# Patient Record
Sex: FEMALE | Race: BLACK OR AFRICAN AMERICAN | Employment: OTHER | URBAN - METROPOLITAN AREA
[De-identification: names, ages, dates, MRNs, and addresses within clinical notes are randomized per-mention and may not be internally consistent; named-entity substitution may affect disease eponyms.]

---

## 2018-10-08 ENCOUNTER — TELEPHONE (OUTPATIENT)
Dept: SURGERY | Age: 63
End: 2018-10-08

## 2018-10-08 NOTE — TELEPHONE ENCOUNTER
Care everywhere was ran. Patient from [place] and has no established [facility].

## 2018-10-10 ENCOUNTER — OFFICE VISIT (OUTPATIENT)
Dept: SURGERY | Age: 63
End: 2018-10-10

## 2018-10-10 VITALS
HEIGHT: 60 IN | BODY MASS INDEX: 32.2 KG/M2 | SYSTOLIC BLOOD PRESSURE: 129 MMHG | DIASTOLIC BLOOD PRESSURE: 82 MMHG | TEMPERATURE: 97.6 F | RESPIRATION RATE: 18 BRPM | HEART RATE: 85 BPM | OXYGEN SATURATION: 98 % | WEIGHT: 164 LBS

## 2018-10-10 DIAGNOSIS — K40.91 RECURRENT LEFT INGUINAL HERNIA: ICD-10-CM

## 2018-10-10 DIAGNOSIS — K40.90 RIGHT INGUINAL HERNIA: ICD-10-CM

## 2018-10-10 DIAGNOSIS — K43.2 RECURRENT VENTRAL HERNIA: Primary | ICD-10-CM

## 2018-10-10 RX ORDER — AMLODIPINE BESYLATE 10 MG/1
TABLET ORAL DAILY
COMMUNITY

## 2018-10-10 RX ORDER — ASPIRIN 325 MG
TABLET, DELAYED RELEASE (ENTERIC COATED) ORAL
COMMUNITY

## 2018-10-10 RX ORDER — HYDROCHLOROTHIAZIDE 25 MG/1
25 TABLET ORAL DAILY
COMMUNITY

## 2018-10-10 NOTE — PROGRESS NOTES
[facility]  General Surgery    Subjective:      HPI: Patient is a very pleasant 52-year-old female with a past medical history that is remarkable for according to the medical records colon resection with colostomy and colostomy reversal.  Last year she underwent incisional hernia repair with mesh and left inguinal hernia repair with mesh in [place]. In December 2018 she noticed that the bulge in the in the left groin had recurred. She states that she did not say anything because it was not bothering her at the time. She was seen over the spring by a physician in [place] who is concerned about the recurrence of the left inguinal hernia and possibly the incisional hernia as well. Her son suggested that she have a opinion here in the [place]. I was asked to see her regarding these hernias. There are no active problems to display for this patient. History reviewed. No pertinent past medical history. Past Surgical History:   Procedure Laterality Date    HX GYN      HX HERNIA REPAIR        Family History   Problem Relation Age of Onset    Hypertension Mother     Hypertension Father       Social History   Substance Use Topics    Smoking status: Never Smoker    Smokeless tobacco: Never Used    Alcohol use Not on file      Not on File    Prior to Admission medications    Medication Sig Start Date End Date Taking? Authorizing Provider   aspirin delayed-release 325 mg tablet Take  by mouth every six (6) hours as needed for Pain. Yes Historical Provider   hydroCHLOROthiazide (HYDRODIURIL) 25 mg tablet Take 25 mg by mouth daily. Yes Historical Provider   amLODIPine (NORVASC) 10 mg tablet Take  by mouth daily. Yes Historical Provider       Review of Systems:    14 systems were reviewed. The results are as above in the HPI and otherwise negative.      Objective:     Vitals:    10/10/18 0913   BP: 129/82   Pulse: 85   Resp: 18   Temp: 97.6 °F (36.4 °C)   TempSrc: Oral SpO2: 98%   Weight: 74.4 kg (164 lb)   Height: 5' 0.25\" (1.53 m)       Physical Exam:  GENERAL: alert, cooperative, no distress, appears stated age,   EYE: conjunctivae/corneas clear. PERRL, EOM's intact. THROAT & NECK: normal and no erythema or exudates noted. ,    LYMPHATIC: Cervical, supraclavicular, and axillary nodes normal. ,   LUNG: clear to auscultation bilaterally,   HEART: regular rate and rhythm, S1, S2 normal, no murmur, click, rub or gallop,   ABDOMEN: soft, non-tender. No evidence of incisional hernia recurrence although there is a asymmetry of the subcutaneous fatty tissues to the right of the umbilicus. Left inguinal hernia recurrence is palpable with cough. A right inguinal hernia is also palpable. Bowel sounds normal. No masses,  no organomegaly,   EXTREMITIES:  extremities normal, atraumatic, no cyanosis or edema,   SKIN: Normal.,   NEUROLOGIC: AOx3. Cranial nerves 2-12 and sensation grossly intact. ,     Data Review:  to be done- CT abd/pelvis    Ms. Peter Goodpasture has a reminder for a \"due or due soon\" health maintenance. I have asked that she contact her primary care provider for follow-up on this health maintenance. Impression:     · Patient with recurrent left inguinal hernia and right inguinal hernia which is new. Plan:     · Will obtain CT of the abdomen and pelvis to fully evaluate the anterior abdominal wall. · Patient will return to see us once the CT scan is completed.       Signed By: Makenna Kaufman MD     October 10, 2018

## 2018-10-10 NOTE — PROGRESS NOTES
HISTORY OF PRESENT ILLNESS  Elizabeth Shankar is a 59 y.o. female. HPI    Review of Systems   Constitutional: Negative. HENT: Negative. Eyes: Negative. Respiratory: Negative. Cardiovascular: Negative. Gastrointestinal: Negative. Negative for abdominal pain. Genitourinary: Positive for frequency. Negative for dysuria, flank pain, hematuria and urgency. Musculoskeletal: Negative. Skin: Negative. Neurological: Negative. Endo/Heme/Allergies: Negative. Psychiatric/Behavioral: Negative.         Physical Exam

## 2018-10-15 ENCOUNTER — HOSPITAL ENCOUNTER (OUTPATIENT)
Dept: CT IMAGING | Age: 63
Discharge: HOME | End: 2018-10-15
Attending: SURGERY
Payer: SELF-PAY

## 2018-10-15 DIAGNOSIS — K43.2 RECURRENT VENTRAL HERNIA: ICD-10-CM

## 2018-10-15 DIAGNOSIS — K40.91 RECURRENT LEFT INGUINAL HERNIA: ICD-10-CM

## 2018-10-15 DIAGNOSIS — K40.90 RIGHT INGUINAL HERNIA: ICD-10-CM

## 2018-10-15 LAB — CREAT UR-MCNC: 0.9 MG/DL (ref 0.6–1.3)

## 2018-10-15 PROCEDURE — 74177 CT ABD & PELVIS W/CONTRAST: CPT

## 2018-10-15 PROCEDURE — 82565 ASSAY OF CREATININE: CPT

## 2018-10-15 PROCEDURE — 74011636320 HC RX REV CODE- 636/320: Performed by: SURGERY

## 2018-10-15 PROCEDURE — 74011000255 HC RX REV CODE- 255: Performed by: SURGERY

## 2018-10-15 RX ORDER — BARIUM SULFATE 20 MG/ML
900 SUSPENSION ORAL
Status: COMPLETED | OUTPATIENT
Start: 2018-10-15 | End: 2018-10-15

## 2018-10-15 RX ADMIN — BARIUM SULFATE 900 ML: 20 SUSPENSION ORAL at 14:48

## 2018-10-15 RX ADMIN — IOPAMIDOL 93 ML: 612 INJECTION, SOLUTION INTRAVENOUS at 14:49

## 2018-10-17 ENCOUNTER — OFFICE VISIT (OUTPATIENT)
Dept: SURGERY | Age: 63
End: 2018-10-17

## 2018-10-17 VITALS
SYSTOLIC BLOOD PRESSURE: 138 MMHG | BODY MASS INDEX: 32.79 KG/M2 | DIASTOLIC BLOOD PRESSURE: 90 MMHG | WEIGHT: 167 LBS | RESPIRATION RATE: 16 BRPM | OXYGEN SATURATION: 96 % | HEIGHT: 60 IN | TEMPERATURE: 97.8 F | HEART RATE: 86 BPM

## 2018-10-17 DIAGNOSIS — K40.90 RIGHT INGUINAL HERNIA: ICD-10-CM

## 2018-10-17 DIAGNOSIS — K40.91 RECURRENT LEFT INGUINAL HERNIA: Primary | ICD-10-CM

## 2018-10-17 RX ORDER — SODIUM CHLORIDE 0.9 % (FLUSH) 0.9 %
5-10 SYRINGE (ML) INJECTION EVERY 8 HOURS
Status: CANCELLED | OUTPATIENT
Start: 2018-10-17

## 2018-10-17 RX ORDER — SODIUM CHLORIDE 0.9 % (FLUSH) 0.9 %
5-10 SYRINGE (ML) INJECTION AS NEEDED
Status: CANCELLED | OUTPATIENT
Start: 2018-10-17

## 2018-10-17 NOTE — PROGRESS NOTES
[facility]  General Surgery    Subjective:      HPI:  Patient is a very pleasant 56-year-old female with a past medical history that is remarkable for according to the medical records colon resection with colostomy and colostomy reversal.  Last year she underwent incisional hernia repair with mesh and left inguinal hernia repair with mesh in [place]. In December 2018 she noticed that the bulge in the in the left groin had recurred. She states that she did not say anything because it was not bothering her at the time. She was seen over the spring by a physician in [place] who is concerned about the recurrence of the left inguinal hernia and possibly the incisional hernia as well. Her son suggested that she have a opinion here in the [place]. I was asked to see her regarding these hernias. She recently underwent CT of the abdomen pelvis which I did review independently on the monitor. The CT reveals small epigastric incisional hernias, larger suprapubic incisional hernia containing bowel, right inguinal hernia containing bowel and a larger left inguinal hernia recurrent containing bowel. There are no active problems to display for this patient. No past medical history on file. Past Surgical History:   Procedure Laterality Date    HX GYN      HX HERNIA REPAIR        Family History   Problem Relation Age of Onset    Hypertension Mother     Hypertension Father       Social History     Tobacco Use    Smoking status: Never Smoker    Smokeless tobacco: Never Used   Substance Use Topics    Alcohol use: Not on file      No Known Allergies    Prior to Admission medications    Medication Sig Start Date End Date Taking? Authorizing Provider   aspirin delayed-release 325 mg tablet Take  by mouth every six (6) hours as needed for Pain. Yes Provider, Historical   hydroCHLOROthiazide (HYDRODIURIL) 25 mg tablet Take 25 mg by mouth daily.    Yes Provider, Historical amLODIPine (NORVASC) 10 mg tablet Take  by mouth daily. Yes Provider, Historical       Review of Systems:    14 systems were reviewed. The results are as above in the HPI and otherwise negative. Objective:     Vitals:    10/17/18 1005   BP: 138/90   Pulse: 86   Resp: 16   Temp: 97.8 °F (36.6 °C)   TempSrc: Oral   SpO2: 96%   Weight: 75.8 kg (167 lb)   Height: 5' 0.25\" (1.53 m)       Physical Exam:  GENERAL: alert, cooperative, no distress, appears stated age,   EYE: conjunctivae/corneas clear. PERRL, EOM's intact. THROAT & NECK: normal and no erythema or exudates noted. ,    LYMPHATIC: Cervical, supraclavicular, and axillary nodes normal. ,   LUNG: clear to auscultation bilaterally,   HEART: regular rate and rhythm, S1, S2 normal, no murmur, click, rub or gallop,   ABDOMEN: soft, nondistended, lower midline incisional hernia is appreciated with Valsalva and cough. Bilateral inguinal hernias are appreciated with Valsalva and cough. The left is larger than the right. . Bowel sounds normal. No masses,  no organomegaly,   EXTREMITIES:  extremities normal, atraumatic, no cyanosis or edema,   SKIN: Normal.,   NEUROLOGIC: AOx3. Cranial nerves 2-12 and sensation grossly intact. ,     Data Review:  to be done    Ms. Lexis Avilez has a reminder for a \"due or due soon\" health maintenance. I have asked that she contact her primary care provider for follow-up on this health maintenance. Impression:     · Patient with multiple incisional hernias and recurrent left inguinal hernia with a new right inguinal hernia.     Plan:     · Bilateral open inguinal hernia repairs with plug and mesh  · Consent on chart  · Preoperative orders written    Signed By: Patti Catherine MD     October 17, 2018

## 2018-11-23 ENCOUNTER — HOSPITAL ENCOUNTER (OUTPATIENT)
Dept: LAB | Age: 63
Discharge: HOME | End: 2018-11-23
Payer: SELF-PAY

## 2018-11-23 ENCOUNTER — HOSPITAL ENCOUNTER (OUTPATIENT)
Dept: PREADMISSION TESTING | Age: 63
Discharge: HOME | End: 2018-11-23
Payer: SELF-PAY

## 2018-11-23 ENCOUNTER — TELEPHONE (OUTPATIENT)
Dept: SURGERY | Age: 63
End: 2018-11-23

## 2018-11-23 DIAGNOSIS — K40.90 RIGHT INGUINAL HERNIA: ICD-10-CM

## 2018-11-23 DIAGNOSIS — K40.91 RECURRENT LEFT INGUINAL HERNIA: ICD-10-CM

## 2018-11-23 LAB
ANION GAP SERPL CALC-SCNC: 5 MMOL/L (ref 3–18)
BASOPHILS # BLD: 0 K/UL (ref 0–0.1)
BASOPHILS NFR BLD: 0 % (ref 0–2)
BUN SERPL-MCNC: 18 MG/DL (ref 7–18)
BUN/CREAT SERPL: 23 (ref 12–20)
CALCIUM SERPL-MCNC: 9 MG/DL (ref 8.5–10.1)
CHLORIDE SERPL-SCNC: 102 MMOL/L (ref 100–108)
CO2 SERPL-SCNC: 32 MMOL/L (ref 21–32)
CREAT SERPL-MCNC: 0.77 MG/DL (ref 0.6–1.3)
DIFFERENTIAL METHOD BLD: ABNORMAL
EOSINOPHIL # BLD: 0.1 K/UL (ref 0–0.4)
EOSINOPHIL NFR BLD: 2 % (ref 0–5)
ERYTHROCYTE [DISTWIDTH] IN BLOOD BY AUTOMATED COUNT: 13.4 % (ref 11.6–14.5)
GLUCOSE SERPL-MCNC: 87 MG/DL (ref 74–99)
HCT VFR BLD AUTO: 39.6 % (ref 35–45)
HGB BLD-MCNC: 13.2 G/DL (ref 12–16)
LYMPHOCYTES # BLD: 3.4 K/UL (ref 0.9–3.6)
LYMPHOCYTES NFR BLD: 51 % (ref 21–52)
MCH RBC QN AUTO: 30.3 PG (ref 24–34)
MCHC RBC AUTO-ENTMCNC: 33.3 G/DL (ref 31–37)
MCV RBC AUTO: 90.8 FL (ref 74–97)
MONOCYTES # BLD: 0.6 K/UL (ref 0.05–1.2)
MONOCYTES NFR BLD: 9 % (ref 3–10)
NEUTS SEG # BLD: 2.5 K/UL (ref 1.8–8)
NEUTS SEG NFR BLD: 38 % (ref 40–73)
PLATELET # BLD AUTO: 268 K/UL (ref 135–420)
PMV BLD AUTO: 10.4 FL (ref 9.2–11.8)
POTASSIUM SERPL-SCNC: 3.3 MMOL/L (ref 3.5–5.5)
RBC # BLD AUTO: 4.36 M/UL (ref 4.2–5.3)
SODIUM SERPL-SCNC: 139 MMOL/L (ref 136–145)
WBC # BLD AUTO: 6.6 K/UL (ref 4.6–13.2)

## 2018-11-23 PROCEDURE — 93005 ELECTROCARDIOGRAM TRACING: CPT

## 2018-11-23 PROCEDURE — 80048 BASIC METABOLIC PNL TOTAL CA: CPT

## 2018-11-23 PROCEDURE — 85025 COMPLETE CBC W/AUTO DIFF WBC: CPT

## 2018-11-23 NOTE — TELEPHONE ENCOUNTER
I called [phone] at 3:29 pm on 11/23/2018 and spoke with Mrs. Hoover's son and confirmed surgery date and time for Monday November 26, 2018. [facility]. ..  [place]

## 2018-11-24 ENCOUNTER — ANESTHESIA EVENT (OUTPATIENT)
Dept: SURGERY | Age: 63
End: 2018-11-24
Payer: SELF-PAY

## 2018-11-25 LAB
ATRIAL RATE: 103 BPM
CALCULATED P AXIS, ECG09: 55 DEGREES
CALCULATED R AXIS, ECG10: -52 DEGREES
CALCULATED T AXIS, ECG11: 16 DEGREES
DIAGNOSIS, 93000: NORMAL
P-R INTERVAL, ECG05: 156 MS
Q-T INTERVAL, ECG07: 378 MS
QRS DURATION, ECG06: 128 MS
QTC CALCULATION (BEZET), ECG08: 495 MS
VENTRICULAR RATE, ECG03: 103 BPM

## 2018-11-26 ENCOUNTER — HOSPITAL ENCOUNTER (OUTPATIENT)
Age: 63
Setting detail: OBSERVATION
Discharge: HOME | End: 2018-11-27
Attending: SURGERY | Admitting: SURGERY
Payer: SELF-PAY

## 2018-11-26 ENCOUNTER — ANESTHESIA (OUTPATIENT)
Dept: SURGERY | Age: 63
End: 2018-11-26
Payer: SELF-PAY

## 2018-11-26 DIAGNOSIS — L76.82 INCISIONAL PAIN: Primary | ICD-10-CM

## 2018-11-26 PROBLEM — K40.20 BILATERAL INGUINAL HERNIA: Status: ACTIVE | Noted: 2018-11-26

## 2018-11-26 PROCEDURE — 77030003028 HC SUT VCRL J&J -A: Performed by: SURGERY

## 2018-11-26 PROCEDURE — C1781 MESH (IMPLANTABLE): HCPCS | Performed by: SURGERY

## 2018-11-26 PROCEDURE — 77030012422 HC DRN WND COVD -A: Performed by: SURGERY

## 2018-11-26 PROCEDURE — 74011250636 HC RX REV CODE- 250/636: Performed by: SURGERY

## 2018-11-26 PROCEDURE — 76010000132 HC OR TIME 2.5 TO 3 HR: Performed by: SURGERY

## 2018-11-26 PROCEDURE — 74011000250 HC RX REV CODE- 250

## 2018-11-26 PROCEDURE — 88302 TISSUE EXAM BY PATHOLOGIST: CPT

## 2018-11-26 PROCEDURE — 74011000250 HC RX REV CODE- 250: Performed by: SURGERY

## 2018-11-26 PROCEDURE — 77030002933 HC SUT MCRYL J&J -A: Performed by: SURGERY

## 2018-11-26 PROCEDURE — 76060000036 HC ANESTHESIA 2.5 TO 3 HR: Performed by: SURGERY

## 2018-11-26 PROCEDURE — C9113 INJ PANTOPRAZOLE SODIUM, VIA: HCPCS | Performed by: SURGERY

## 2018-11-26 PROCEDURE — 74011250636 HC RX REV CODE- 250/636: Performed by: NURSE ANESTHETIST, CERTIFIED REGISTERED

## 2018-11-26 PROCEDURE — 99218 HC RM OBSERVATION: CPT

## 2018-11-26 PROCEDURE — 77030031139 HC SUT VCRL2 J&J -A: Performed by: SURGERY

## 2018-11-26 PROCEDURE — 77010033678 HC OXYGEN DAILY

## 2018-11-26 PROCEDURE — 77030002986 HC SUT PROL J&J -A: Performed by: SURGERY

## 2018-11-26 PROCEDURE — 77030002966 HC SUT PDS J&J -A: Performed by: SURGERY

## 2018-11-26 PROCEDURE — 74011250637 HC RX REV CODE- 250/637: Performed by: NURSE ANESTHETIST, CERTIFIED REGISTERED

## 2018-11-26 PROCEDURE — 77030018836 HC SOL IRR NACL ICUM -A: Performed by: SURGERY

## 2018-11-26 PROCEDURE — 77030026438 HC STYL ET INTUB CARD -A: Performed by: NURSE ANESTHETIST, CERTIFIED REGISTERED

## 2018-11-26 PROCEDURE — 74011250636 HC RX REV CODE- 250/636

## 2018-11-26 PROCEDURE — 77030008683 HC TU ET CUF COVD -A: Performed by: NURSE ANESTHETIST, CERTIFIED REGISTERED

## 2018-11-26 PROCEDURE — 77030013079 HC BLNKT BAIR HGGR 3M -A: Performed by: NURSE ANESTHETIST, CERTIFIED REGISTERED

## 2018-11-26 PROCEDURE — 76210000016 HC OR PH I REC 1 TO 1.5 HR: Performed by: SURGERY

## 2018-11-26 PROCEDURE — 77030012012 HC AIRWY OP SFT TELE -A: Performed by: NURSE ANESTHETIST, CERTIFIED REGISTERED

## 2018-11-26 PROCEDURE — 77030034850: Performed by: SURGERY

## 2018-11-26 PROCEDURE — 77030027138 HC INCENT SPIROMETER -A

## 2018-11-26 PROCEDURE — 77030020782 HC GWN BAIR PAWS FLX 3M -B: Performed by: SURGERY

## 2018-11-26 DEVICE — PERFIX PLUG, 1.6" X 1.9" (4.1 CM X 4.8 CM), LARGE (CONTENTS: 2)
Type: IMPLANTABLE DEVICE | Site: INGUINAL | Status: FUNCTIONAL
Brand: PERFIX

## 2018-11-26 RX ORDER — ONDANSETRON 2 MG/ML
4 INJECTION INTRAMUSCULAR; INTRAVENOUS ONCE
Status: DISCONTINUED | OUTPATIENT
Start: 2018-11-26 | End: 2018-11-26 | Stop reason: HOSPADM

## 2018-11-26 RX ORDER — GLYCOPYRROLATE 0.2 MG/ML
INJECTION INTRAMUSCULAR; INTRAVENOUS AS NEEDED
Status: DISCONTINUED | OUTPATIENT
Start: 2018-11-26 | End: 2018-11-26 | Stop reason: HOSPADM

## 2018-11-26 RX ORDER — OXYCODONE AND ACETAMINOPHEN 5; 325 MG/1; MG/1
2 TABLET ORAL
Status: DISCONTINUED | OUTPATIENT
Start: 2018-11-26 | End: 2018-11-27 | Stop reason: HOSPADM

## 2018-11-26 RX ORDER — HYDROMORPHONE HYDROCHLORIDE 2 MG/ML
0.5 INJECTION, SOLUTION INTRAMUSCULAR; INTRAVENOUS; SUBCUTANEOUS
Status: DISCONTINUED | OUTPATIENT
Start: 2018-11-26 | End: 2018-11-27 | Stop reason: HOSPADM

## 2018-11-26 RX ORDER — SODIUM CHLORIDE 0.9 % (FLUSH) 0.9 %
5-10 SYRINGE (ML) INJECTION AS NEEDED
Status: DISCONTINUED | OUTPATIENT
Start: 2018-11-26 | End: 2018-11-27 | Stop reason: HOSPADM

## 2018-11-26 RX ORDER — LIDOCAINE HYDROCHLORIDE 20 MG/ML
INJECTION, SOLUTION EPIDURAL; INFILTRATION; INTRACAUDAL; PERINEURAL AS NEEDED
Status: DISCONTINUED | OUTPATIENT
Start: 2018-11-26 | End: 2018-11-26 | Stop reason: HOSPADM

## 2018-11-26 RX ORDER — ONDANSETRON 2 MG/ML
4 INJECTION INTRAMUSCULAR; INTRAVENOUS
Status: DISCONTINUED | OUTPATIENT
Start: 2018-11-26 | End: 2018-11-27 | Stop reason: HOSPADM

## 2018-11-26 RX ORDER — CEFAZOLIN SODIUM 2 G/50ML
2 SOLUTION INTRAVENOUS EVERY 8 HOURS
Status: COMPLETED | OUTPATIENT
Start: 2018-11-26 | End: 2018-11-26

## 2018-11-26 RX ORDER — SODIUM CHLORIDE 0.9 % (FLUSH) 0.9 %
5-10 SYRINGE (ML) INJECTION AS NEEDED
Status: DISCONTINUED | OUTPATIENT
Start: 2018-11-26 | End: 2018-11-26 | Stop reason: HOSPADM

## 2018-11-26 RX ORDER — PROPOFOL 10 MG/ML
INJECTION, EMULSION INTRAVENOUS AS NEEDED
Status: DISCONTINUED | OUTPATIENT
Start: 2018-11-26 | End: 2018-11-26 | Stop reason: HOSPADM

## 2018-11-26 RX ORDER — CEFAZOLIN SODIUM 2 G/50ML
2 SOLUTION INTRAVENOUS ONCE
Status: COMPLETED | OUTPATIENT
Start: 2018-11-26 | End: 2018-11-26

## 2018-11-26 RX ORDER — BUPIVACAINE HYDROCHLORIDE AND EPINEPHRINE 2.5; 5 MG/ML; UG/ML
INJECTION, SOLUTION EPIDURAL; INFILTRATION; INTRACAUDAL; PERINEURAL AS NEEDED
Status: DISCONTINUED | OUTPATIENT
Start: 2018-11-26 | End: 2018-11-26 | Stop reason: HOSPADM

## 2018-11-26 RX ORDER — MAGNESIUM SULFATE 100 %
4 CRYSTALS MISCELLANEOUS AS NEEDED
Status: DISCONTINUED | OUTPATIENT
Start: 2018-11-26 | End: 2018-11-26 | Stop reason: HOSPADM

## 2018-11-26 RX ORDER — SODIUM CHLORIDE, SODIUM LACTATE, POTASSIUM CHLORIDE, CALCIUM CHLORIDE 600; 310; 30; 20 MG/100ML; MG/100ML; MG/100ML; MG/100ML
75 INJECTION, SOLUTION INTRAVENOUS CONTINUOUS
Status: DISCONTINUED | OUTPATIENT
Start: 2018-11-26 | End: 2018-11-26 | Stop reason: HOSPADM

## 2018-11-26 RX ORDER — MIDAZOLAM HYDROCHLORIDE 1 MG/ML
INJECTION, SOLUTION INTRAMUSCULAR; INTRAVENOUS AS NEEDED
Status: DISCONTINUED | OUTPATIENT
Start: 2018-11-26 | End: 2018-11-26 | Stop reason: HOSPADM

## 2018-11-26 RX ORDER — SODIUM CHLORIDE 0.9 % (FLUSH) 0.9 %
5-10 SYRINGE (ML) INJECTION EVERY 8 HOURS
Status: DISCONTINUED | OUTPATIENT
Start: 2018-11-26 | End: 2018-11-27 | Stop reason: HOSPADM

## 2018-11-26 RX ORDER — SODIUM CHLORIDE, SODIUM LACTATE, POTASSIUM CHLORIDE, CALCIUM CHLORIDE 600; 310; 30; 20 MG/100ML; MG/100ML; MG/100ML; MG/100ML
100 INJECTION, SOLUTION INTRAVENOUS CONTINUOUS
Status: DISPENSED | OUTPATIENT
Start: 2018-11-26 | End: 2018-11-27

## 2018-11-26 RX ORDER — ENOXAPARIN SODIUM 100 MG/ML
40 INJECTION SUBCUTANEOUS EVERY 24 HOURS
Status: DISCONTINUED | OUTPATIENT
Start: 2018-11-26 | End: 2018-11-27 | Stop reason: HOSPADM

## 2018-11-26 RX ORDER — FAMOTIDINE 20 MG/1
20 TABLET, FILM COATED ORAL ONCE
Status: COMPLETED | OUTPATIENT
Start: 2018-11-26 | End: 2018-11-26

## 2018-11-26 RX ORDER — NEOSTIGMINE METHYLSULFATE 5 MG/5 ML
SYRINGE (ML) INTRAVENOUS AS NEEDED
Status: DISCONTINUED | OUTPATIENT
Start: 2018-11-26 | End: 2018-11-26 | Stop reason: HOSPADM

## 2018-11-26 RX ORDER — PHENYLEPHRINE HCL IN 0.9% NACL 1 MG/10 ML
SYRINGE (ML) INTRAVENOUS AS NEEDED
Status: DISCONTINUED | OUTPATIENT
Start: 2018-11-26 | End: 2018-11-26 | Stop reason: HOSPADM

## 2018-11-26 RX ORDER — KETOROLAC TROMETHAMINE 30 MG/ML
30 INJECTION, SOLUTION INTRAMUSCULAR; INTRAVENOUS EVERY 8 HOURS
Status: DISCONTINUED | OUTPATIENT
Start: 2018-11-26 | End: 2018-11-27 | Stop reason: HOSPADM

## 2018-11-26 RX ORDER — KETOROLAC TROMETHAMINE 30 MG/ML
INJECTION, SOLUTION INTRAMUSCULAR; INTRAVENOUS AS NEEDED
Status: DISCONTINUED | OUTPATIENT
Start: 2018-11-26 | End: 2018-11-26 | Stop reason: HOSPADM

## 2018-11-26 RX ORDER — INSULIN LISPRO 100 [IU]/ML
INJECTION, SOLUTION INTRAVENOUS; SUBCUTANEOUS ONCE
Status: DISCONTINUED | OUTPATIENT
Start: 2018-11-26 | End: 2018-11-26 | Stop reason: HOSPADM

## 2018-11-26 RX ORDER — SODIUM CHLORIDE 0.9 % (FLUSH) 0.9 %
5-10 SYRINGE (ML) INJECTION EVERY 8 HOURS
Status: DISCONTINUED | OUTPATIENT
Start: 2018-11-26 | End: 2018-11-26 | Stop reason: HOSPADM

## 2018-11-26 RX ORDER — EPHEDRINE SULFATE/0.9% NACL/PF 25 MG/5 ML
SYRINGE (ML) INTRAVENOUS AS NEEDED
Status: DISCONTINUED | OUTPATIENT
Start: 2018-11-26 | End: 2018-11-26 | Stop reason: HOSPADM

## 2018-11-26 RX ORDER — METOPROLOL TARTRATE 5 MG/5ML
INJECTION INTRAVENOUS AS NEEDED
Status: DISCONTINUED | OUTPATIENT
Start: 2018-11-26 | End: 2018-11-26 | Stop reason: HOSPADM

## 2018-11-26 RX ORDER — SUCCINYLCHOLINE CHLORIDE 20 MG/ML
INJECTION INTRAMUSCULAR; INTRAVENOUS AS NEEDED
Status: DISCONTINUED | OUTPATIENT
Start: 2018-11-26 | End: 2018-11-26 | Stop reason: HOSPADM

## 2018-11-26 RX ORDER — ONDANSETRON 2 MG/ML
INJECTION INTRAMUSCULAR; INTRAVENOUS AS NEEDED
Status: DISCONTINUED | OUTPATIENT
Start: 2018-11-26 | End: 2018-11-26 | Stop reason: HOSPADM

## 2018-11-26 RX ORDER — VECURONIUM BROMIDE FOR INJECTION 1 MG/ML
INJECTION, POWDER, LYOPHILIZED, FOR SOLUTION INTRAVENOUS AS NEEDED
Status: DISCONTINUED | OUTPATIENT
Start: 2018-11-26 | End: 2018-11-26 | Stop reason: HOSPADM

## 2018-11-26 RX ORDER — FENTANYL CITRATE 50 UG/ML
INJECTION, SOLUTION INTRAMUSCULAR; INTRAVENOUS AS NEEDED
Status: DISCONTINUED | OUTPATIENT
Start: 2018-11-26 | End: 2018-11-26 | Stop reason: HOSPADM

## 2018-11-26 RX ORDER — FENTANYL CITRATE 50 UG/ML
50 INJECTION, SOLUTION INTRAMUSCULAR; INTRAVENOUS
Status: DISCONTINUED | OUTPATIENT
Start: 2018-11-26 | End: 2018-11-26 | Stop reason: ALTCHOICE

## 2018-11-26 RX ORDER — DEXTROSE 50 % IN WATER (D50W) INTRAVENOUS SYRINGE
25-50 AS NEEDED
Status: DISCONTINUED | OUTPATIENT
Start: 2018-11-26 | End: 2018-11-26 | Stop reason: HOSPADM

## 2018-11-26 RX ORDER — ROCURONIUM BROMIDE 10 MG/ML
INJECTION, SOLUTION INTRAVENOUS AS NEEDED
Status: DISCONTINUED | OUTPATIENT
Start: 2018-11-26 | End: 2018-11-26 | Stop reason: HOSPADM

## 2018-11-26 RX ORDER — DEXAMETHASONE SODIUM PHOSPHATE 4 MG/ML
INJECTION, SOLUTION INTRA-ARTICULAR; INTRALESIONAL; INTRAMUSCULAR; INTRAVENOUS; SOFT TISSUE AS NEEDED
Status: DISCONTINUED | OUTPATIENT
Start: 2018-11-26 | End: 2018-11-26 | Stop reason: HOSPADM

## 2018-11-26 RX ORDER — LABETALOL HCL 20 MG/4 ML
5 SYRINGE (ML) INTRAVENOUS AS NEEDED
Status: DISCONTINUED | OUTPATIENT
Start: 2018-11-26 | End: 2018-11-26 | Stop reason: HOSPADM

## 2018-11-26 RX ADMIN — Medication 4 MG: at 09:42

## 2018-11-26 RX ADMIN — PROPOFOL 200 MG: 10 INJECTION, EMULSION INTRAVENOUS at 07:42

## 2018-11-26 RX ADMIN — VECURONIUM BROMIDE FOR INJECTION 2 MG: 1 INJECTION, POWDER, LYOPHILIZED, FOR SOLUTION INTRAVENOUS at 07:55

## 2018-11-26 RX ADMIN — SODIUM CHLORIDE 40 MG: 9 INJECTION, SOLUTION INTRAMUSCULAR; INTRAVENOUS; SUBCUTANEOUS at 13:12

## 2018-11-26 RX ADMIN — Medication 5 MG: at 08:41

## 2018-11-26 RX ADMIN — CEFAZOLIN SODIUM 2 G: 2 SOLUTION INTRAVENOUS at 23:22

## 2018-11-26 RX ADMIN — Medication 10 ML: at 13:14

## 2018-11-26 RX ADMIN — CEFAZOLIN SODIUM 2 G: 2 SOLUTION INTRAVENOUS at 14:52

## 2018-11-26 RX ADMIN — Medication 100 MCG: at 08:03

## 2018-11-26 RX ADMIN — ROCURONIUM BROMIDE 5 MG: 10 INJECTION, SOLUTION INTRAVENOUS at 07:42

## 2018-11-26 RX ADMIN — SODIUM CHLORIDE, SODIUM LACTATE, POTASSIUM CHLORIDE, AND CALCIUM CHLORIDE: 600; 310; 30; 20 INJECTION, SOLUTION INTRAVENOUS at 09:44

## 2018-11-26 RX ADMIN — MIDAZOLAM HYDROCHLORIDE 2 MG: 1 INJECTION, SOLUTION INTRAMUSCULAR; INTRAVENOUS at 07:28

## 2018-11-26 RX ADMIN — Medication 10 MG: at 08:50

## 2018-11-26 RX ADMIN — Medication 100 MCG: at 08:50

## 2018-11-26 RX ADMIN — SODIUM CHLORIDE, SODIUM LACTATE, POTASSIUM CHLORIDE, AND CALCIUM CHLORIDE 100 ML/HR: 600; 310; 30; 20 INJECTION, SOLUTION INTRAVENOUS at 14:52

## 2018-11-26 RX ADMIN — CEFAZOLIN SODIUM 2 G: 2 SOLUTION INTRAVENOUS at 07:33

## 2018-11-26 RX ADMIN — FENTANYL CITRATE 50 MCG: 50 INJECTION, SOLUTION INTRAMUSCULAR; INTRAVENOUS at 08:42

## 2018-11-26 RX ADMIN — SODIUM CHLORIDE, SODIUM LACTATE, POTASSIUM CHLORIDE, AND CALCIUM CHLORIDE 100 ML/HR: 600; 310; 30; 20 INJECTION, SOLUTION INTRAVENOUS at 13:13

## 2018-11-26 RX ADMIN — Medication 100 MCG: at 08:49

## 2018-11-26 RX ADMIN — LIDOCAINE HYDROCHLORIDE 60 MG: 20 INJECTION, SOLUTION EPIDURAL; INFILTRATION; INTRACAUDAL; PERINEURAL at 07:42

## 2018-11-26 RX ADMIN — SODIUM CHLORIDE, SODIUM LACTATE, POTASSIUM CHLORIDE, AND CALCIUM CHLORIDE 75 ML/HR: 600; 310; 30; 20 INJECTION, SOLUTION INTRAVENOUS at 07:22

## 2018-11-26 RX ADMIN — GLYCOPYRROLATE 0.6 MG: 0.2 INJECTION INTRAMUSCULAR; INTRAVENOUS at 09:42

## 2018-11-26 RX ADMIN — VECURONIUM BROMIDE FOR INJECTION 1 MG: 1 INJECTION, POWDER, LYOPHILIZED, FOR SOLUTION INTRAVENOUS at 08:50

## 2018-11-26 RX ADMIN — Medication 10 MG: at 08:04

## 2018-11-26 RX ADMIN — SUCCINYLCHOLINE CHLORIDE 140 MG: 20 INJECTION INTRAMUSCULAR; INTRAVENOUS at 07:43

## 2018-11-26 RX ADMIN — Medication 10 MG: at 08:15

## 2018-11-26 RX ADMIN — DEXAMETHASONE SODIUM PHOSPHATE 8 MG: 4 INJECTION, SOLUTION INTRA-ARTICULAR; INTRALESIONAL; INTRAMUSCULAR; INTRAVENOUS; SOFT TISSUE at 07:50

## 2018-11-26 RX ADMIN — Medication 100 MCG: at 08:10

## 2018-11-26 RX ADMIN — METOPROLOL TARTRATE 1 MG: 5 INJECTION INTRAVENOUS at 09:11

## 2018-11-26 RX ADMIN — KETOROLAC TROMETHAMINE 30 MG: 30 INJECTION, SOLUTION INTRAMUSCULAR at 17:31

## 2018-11-26 RX ADMIN — FAMOTIDINE 20 MG: 20 TABLET ORAL at 07:22

## 2018-11-26 RX ADMIN — FENTANYL CITRATE 100 MCG: 50 INJECTION, SOLUTION INTRAMUSCULAR; INTRAVENOUS at 07:42

## 2018-11-26 RX ADMIN — METOPROLOL TARTRATE 2 MG: 5 INJECTION INTRAVENOUS at 07:55

## 2018-11-26 RX ADMIN — ONDANSETRON 4 MG: 2 INJECTION INTRAMUSCULAR; INTRAVENOUS at 09:40

## 2018-11-26 RX ADMIN — KETOROLAC TROMETHAMINE 30 MG: 30 INJECTION, SOLUTION INTRAMUSCULAR; INTRAVENOUS at 09:37

## 2018-11-26 RX ADMIN — Medication 100 MCG: at 08:40

## 2018-11-26 RX ADMIN — ENOXAPARIN SODIUM 40 MG: 100 INJECTION SUBCUTANEOUS at 13:12

## 2018-11-26 RX ADMIN — FENTANYL CITRATE 50 MCG: 50 INJECTION, SOLUTION INTRAMUSCULAR; INTRAVENOUS at 09:13

## 2018-11-26 RX ADMIN — Medication 10 ML: at 23:22

## 2018-11-26 RX ADMIN — KETOROLAC TROMETHAMINE 30 MG: 30 INJECTION, SOLUTION INTRAMUSCULAR at 23:22

## 2018-11-26 NOTE — H&P
[facility] General Surgery 
  
 
 
Impression:  
  
· Patient with multiple incisional hernias and recurrent left inguinal hernia with a new right inguinal hernia. 
  
Plan:  
  
· Bilateral open inguinal hernia repairs with plug and mesh · Consent on chart · Preoperative orders written Subjective: HPI:  Patient is a very pleasant 56-year-old female with a past medical history that is remarkable for according to the medical records colon resection with colostomy and colostomy reversal.  Last year she underwent incisional hernia repair with mesh and left inguinal hernia repair with mesh in [place].  In December 2017 she noticed that the bulge in the in the left groin had recurred. Camila Cardoza states that she did not say anything because it was not bothering her at the time. Camila Cardoza was seen over the spring by a physician in [place] who is concerned about the recurrence of the left inguinal hernia and possibly the incisional hernia as well. ABIMBOLA S[facility] son suggested that she have a opinion here in the [place]. Mariam Puentegioeric was asked to see her regarding these hernias. She recently underwent CT of the abdomen pelvis which I did review independently on the monitor. The CT reveals small epigastric incisional hernias, larger suprapubic incisional hernia containing bowel, right inguinal hernia containing bowel and a larger left inguinal hernia recurrent containing bowel. 
  
  
  
There are no active problems to display for this patient. 
  
No past medical history on file. Past Surgical History:  
Procedure Laterality Date  HX GYN      
 HX HERNIA REPAIR      
  
     
Family History Problem Relation Age of Onset  Hypertension Mother    
 Hypertension Father    
  
Social History  
  
    
Tobacco Use  Smoking status: Never Smoker  Smokeless tobacco: Never Used Substance Use Topics  Alcohol use: Not on file No Known Allergies Prior to Admission medications Medication Sig Start Date End Date Taking? Authorizing Provider  
aspirin delayed-release 325 mg tablet Take  by mouth every six (6) hours as needed for Pain.     Yes Provider, Historical  
hydroCHLOROthiazide (HYDRODIURIL) 25 mg tablet Take 25 mg by mouth daily.     Yes Provider, Historical  
amLODIPine (NORVASC) 10 mg tablet Take  by mouth daily.     Yes Provider, Historical  
  
  
Review of Systems:   
14 systems were reviewed. The results are as above in the HPI and otherwise negative.  
  
Objective:  
  
   
Vitals:  
  10/17/18 1005 BP: 138/90 Pulse: 86 Resp: 16 Temp: 97.8 °F (36.6 °C) TempSrc: Oral  
SpO2: 96% Weight: 75.8 kg (167 lb) Height: 5' 0.25\" (1.53 m)  
  
  
Physical Exam: 
GENERAL: alert, cooperative, no distress, appears stated age, EYE: conjunctivae/corneas clear. PERRL, EOM's intact. THROAT & NECK: normal and no erythema or exudates noted. ,   
LYMPHATIC: Cervical, supraclavicular, and axillary nodes normal. ,  
LUNG: clear to auscultation bilaterally, HEART: regular rate and rhythm, S1, S2 normal, no murmur, click, rub or gallop, ABDOMEN: soft, nondistended, lower midline incisional hernia is appreciated with Valsalva and cough. Bilateral inguinal hernias are appreciated with Valsalva and cough. The left is larger than the right. . Bowel sounds normal. No masses,  no organomegaly, EXTREMITIES:  extremities normal, atraumatic, no cyanosis or edema, SKIN: Normal., NEUROLOGIC: AOx3. Cranial nerves 2-12 and sensation grossly intact. ,  
  
Data Review:  to be done 
  
Ms. Shiraz Mireles has a reminder for a \"due or due soon\" health maintenance. I have asked that she contact her primary care provider for follow-up on this health maintenance.

## 2018-11-26 NOTE — OP NOTES
[address] [place] [address]                                [address] [facility] Kaitlin Loza, [address] [facility]                                 OPERATIVE REPORT    Faiza Jordan  [id number]  [id number]  OR/PL  ATTENDING:   Leslie Lamas MD        PREOPERATIVE DIAGNOSIS:  Bilateral inguinal hernia    POSTOPERATIVE DIAGNOSIS:  Bilateral direct inguinal hernia    PROCEDURES PERFORMED:  Open repair of bilateral inguinal hernias with plug and mesh. SURGEON:  VIDAL Royal SA    ANESTHESIA:  General, and local (0.25% Marcaine with epinephrine). ESTIMATED BLOOD LOSS:  10 mL. FLUIDS GIVEN:  1000 mL. FINDINGS:  Bilateral inguinal hernias. SPECIMENS REMOVED:  Bilateral hernia sacs    OPERATIVE INDICATION:  bilateral inguinal hernia. DESCRIPTION OF PROCEDURE:  The patient was identified in the holding area   where consent for a bilateral inguinal hernia repair  with mesh was verified.  The clipper prep of the hair in the genital area  was performed. In the operating room, the patient was placed  under general anesthesia.  The excess hair was removed.    The lower abdomen and groin area were then prepped using iodine povidone scrub and paint.  The sterile  drapes were applied.  A time-out was performed to ensure the correct  procedure.  The left inguinal hernia was repaired first.    Local anesthetic was infiltrated into the skin and deep dermal  tissues at the left anterior superior iliac spine and in the proposed  incision site, overlapping the pubic tubercle.  The #15-blade was used to  create an incision through the skin and subcutaneous tissue.   Electrocautery was used for hemostasis and to dissect down through Marcin  fascia to expose the external oblique fascia.  Additional local anesthetic  was infiltrated into the inguinal canal through the external oblique  fascia.  The #15-blade was used to incise the fascia, and the Metzenbaums  were used to open up the fascia medially and laterally.  Hemostats were  placed on the edges of the fascia.  The hernia was immediately obvious with  small bowel within a thin hernia sac, constituting a direct hernia.  This  was dissected free using a combination of blunt dissection, sharp  dissection, and electrocautery.  The intestinal contents were without any  evidence of ischemia, bleeding, or purulence.  The bowel contents were  returned into the abdomen.  The large plug was used to plug the defect  using 2-0 Prolene.  The inner leaflets of the plug were sewn with  interrupted stitches to the internal oblique musculature in two places, the  conjoined tendon pubic tubercle and the Leland ligament, and the shelving  edge of the inguinal ligament in two places.  The sutures were tied into  place.  The mesh set nicely within the defect. An additional stitch was placed into the conjoined tendon to completely  secure the defect.  The patch was then placed to overlap the pubic  tubercle, and interrupted stitches were used to secure the patch at the  pubic tubercle, the internal oblique musculature, and the shelving edge of  the inguinal ligament.  The tails of the patch were tucked laterally to  protect the lateral triangle.  The 2-0 Vicryl suture was used to  reapproximate the fascia of the external oblique musculature.  Irrigation  was performed.  No bleeding was noted.  The 3-0 Vicryl suture was used to  reapproximate the deep dermal tissues and Marcin's fascia.  A 4-0 Vicryl  suture was used to reapproximate the skin in a running subcuticular  closure.    The right inguinal hernia was then repaired. Local anesthetic was infiltrated into the skin and deep dermal  tissues at the left anterior superior iliac spine and in the proposed  incision site, overlapping the pubic tubercle.  The #15-blade was used to  create an incision through the skin and subcutaneous tissue.   Electrocautery was used for hemostasis and to dissect down through Marcin  fascia to expose the external oblique fascia.  Additional local anesthetic  was infiltrated into the inguinal canal through the external oblique  fascia.  The #15-blade was used to incise the fascia, and the Metzenbaums  were used to open up the fascia medially and laterally.  Hemostats were  placed on the edges of the fascia.  The hernia was immediately obvious with  small bowel within a thin hernia sac, constituting a direct hernia.  This  was dissected free using a combination of blunt dissection, sharp  dissection, and electrocautery.  The intestinal contents were without any  evidence of ischemia, bleeding, or purulence.  The bowel contents were  returned into the abdomen.  The large plug was used to plug the defect  using 2-0 Prolene.  The inner leaflets of the plug were sewn with  interrupted stitches to the internal oblique musculature in two places, the  conjoined tendon pubic tubercle and the Leland ligament, and the shelving  edge of the inguinal ligament in two places.  The sutures were tied into  place.  The mesh set nicely within the defect. An additional stitch was placed into the conjoined tendon to completely  secure the defect.  The patch was then placed to overlap the pubic  tubercle, and interrupted stitches were used to secure the patch at the  pubic tubercle, the internal oblique musculature, and the shelving edge of  the inguinal ligament.  The tails of the patch were tucked laterally to  protect the lateral triangle.  The 2-0 Vicryl suture was used to  reapproximate the fascia of the external oblique musculature.  Irrigation  was performed.  No bleeding was noted.  The 3-0 Vicryl suture was used to  reapproximate the deep dermal tissues and Marcin's fascia.  A 4-0 Vicryl  suture was used to reapproximate the skin in a running subcuticular  closure.  Steristrips, 4x4 guaze and tagederm were used to create a sterile dressing.   The patient tolerated the procedure very well. DISPOSITION:  The patient was extubated and stable upon transport to the  recovery room.

## 2018-11-26 NOTE — ANESTHESIA POSTPROCEDURE EVALUATION
Procedure(s): BILATERAL OPEN INGUINAL HERNIA REPAIRS WITH MESH. Anesthesia Post Evaluation Multimodal analgesia: multimodal analgesia used between 6 hours prior to anesthesia start to PACU discharge Patient location during evaluation: PACU Patient participation: complete - patient participated Level of consciousness: awake Pain score: 0 Pain management: satisfactory to patient Airway patency: patent Anesthetic complications: no 
Cardiovascular status: acceptable Respiratory status: acceptable Hydration status: acceptable Post anesthesia nausea and vomiting:  none Visit Vitals /73 Pulse (!) 102 Temp 36.9 °C (98.5 °F) Resp 21 Ht 5' (1.524 m) Wt 76.5 kg (168 lb 9.6 oz) SpO2 96% BMI 32.93 kg/m²

## 2018-11-26 NOTE — PROGRESS NOTES
Observation notice provided in writing to patient and/or caregiver as well as verbal explanation of the policy at bedside and signature obtained. Original signed document placed on patients chart.

## 2018-11-26 NOTE — PERIOP NOTES
TRANSFER - OUT REPORT: 
 
Verbal report given to PANKAJ Dinero(name) on [place]  being transferred to [address] [place] [address](unit) for routine post - op Report consisted of patients Situation, Background, Assessment and  
Recommendations(SBAR). Information from the following report(s) SBAR, Kardex, OR Summary, Procedure Summary, Intake/Output, MAR, Recent Results and Med Rec Status was reviewed with the receiving nurse. Lines:  
Peripheral IV 11/26/18 Left Hand (Active) Site Assessment Clean, dry, & intact 11/26/2018 10:10 AM  
Phlebitis Assessment 0 11/26/2018 10:10 AM  
Infiltration Assessment 0 11/26/2018 10:10 AM  
Dressing Status Clean, dry, & intact 11/26/2018 10:10 AM  
Dressing Type Tape;Transparent 11/26/2018 10:10 AM  
Hub Color/Line Status Pink;Capped 11/26/2018 10:10 AM  
   
Peripheral IV 11/26/18 Right Hand (Active) Site Assessment Clean, dry, & intact 11/26/2018 10:10 AM  
Phlebitis Assessment 0 11/26/2018 10:10 AM  
Infiltration Assessment 0 11/26/2018 10:10 AM  
Dressing Status Clean, dry, & intact 11/26/2018 10:10 AM  
Dressing Type Tape;Transparent 11/26/2018 10:10 AM  
Hub Color/Line Status Blue; Infusing 11/26/2018 10:10 AM  
Alcohol Cap Used No 11/26/2018  7:31 AM  
  
 
Opportunity for questions and clarification was provided. Patient transported with: 
 O2 @ 2 liters Tech

## 2018-11-26 NOTE — PROGRESS NOTES
Problem: Falls - Risk of 
Goal: *Absence of Falls Document Kimberley Karimi Fall Risk and appropriate interventions in the flowsheet. Outcome: Progressing Towards Goal 
Fall Risk Interventions: 
  
 
  
 
Medication Interventions: Patient to call before getting OOB

## 2018-11-26 NOTE — ANESTHESIA PREPROCEDURE EVALUATION
Anesthetic History No history of anesthetic complications Review of Systems / Medical History Patient summary reviewed and pertinent labs reviewed Pulmonary Within defined limits Neuro/Psych Within defined limits Cardiovascular Hypertension: well controlled Exercise tolerance: >4 METS 
  
GI/Hepatic/Renal 
Within defined limits Endo/Other Within defined limits Other Findings Physical Exam 
 
Airway Mallampati: II 
TM Distance: 4 - 6 cm Neck ROM: normal range of motion Mouth opening: Normal 
 
 Cardiovascular Regular rate and rhythm,  S1 and S2 normal,  no murmur, click, rub, or gallop Dental 
 
 
Comments: Left upper premolar cracked Pulmonary Breath sounds clear to auscultation Abdominal 
GI exam deferred Other Findings Anesthetic Plan ASA: 2 Anesthesia type: general 
 
 
 
 
Induction: Intravenous Anesthetic plan and risks discussed with: Patient

## 2018-11-27 VITALS
SYSTOLIC BLOOD PRESSURE: 120 MMHG | WEIGHT: 172.2 LBS | DIASTOLIC BLOOD PRESSURE: 82 MMHG | RESPIRATION RATE: 18 BRPM | TEMPERATURE: 99 F | HEIGHT: 60 IN | HEART RATE: 100 BPM | OXYGEN SATURATION: 97 % | BODY MASS INDEX: 33.81 KG/M2

## 2018-11-27 LAB
ANION GAP SERPL CALC-SCNC: 9 MMOL/L (ref 3–18)
BUN SERPL-MCNC: 16 MG/DL (ref 7–18)
BUN/CREAT SERPL: 20 (ref 12–20)
CALCIUM SERPL-MCNC: 8.8 MG/DL (ref 8.5–10.1)
CHLORIDE SERPL-SCNC: 101 MMOL/L (ref 100–108)
CO2 SERPL-SCNC: 29 MMOL/L (ref 21–32)
CREAT SERPL-MCNC: 0.81 MG/DL (ref 0.6–1.3)
ERYTHROCYTE [DISTWIDTH] IN BLOOD BY AUTOMATED COUNT: 14.1 % (ref 11.6–14.5)
GLUCOSE SERPL-MCNC: 127 MG/DL (ref 74–99)
HCT VFR BLD AUTO: 34.3 % (ref 35–45)
HGB BLD-MCNC: 11.3 G/DL (ref 12–16)
MAGNESIUM SERPL-MCNC: 2.2 MG/DL (ref 1.6–2.6)
MCH RBC QN AUTO: 29.4 PG (ref 24–34)
MCHC RBC AUTO-ENTMCNC: 32.9 G/DL (ref 31–37)
MCV RBC AUTO: 89.3 FL (ref 74–97)
PHOSPHATE SERPL-MCNC: 4.2 MG/DL (ref 2.5–4.9)
PLATELET # BLD AUTO: 239 K/UL (ref 135–420)
PMV BLD AUTO: 9.6 FL (ref 9.2–11.8)
POTASSIUM SERPL-SCNC: 3.6 MMOL/L (ref 3.5–5.5)
RBC # BLD AUTO: 3.84 M/UL (ref 4.2–5.3)
SODIUM SERPL-SCNC: 139 MMOL/L (ref 136–145)
WBC # BLD AUTO: 14.5 K/UL (ref 4.6–13.2)

## 2018-11-27 PROCEDURE — 84100 ASSAY OF PHOSPHORUS: CPT

## 2018-11-27 PROCEDURE — 36415 COLL VENOUS BLD VENIPUNCTURE: CPT

## 2018-11-27 PROCEDURE — 80048 BASIC METABOLIC PNL TOTAL CA: CPT

## 2018-11-27 PROCEDURE — 99218 HC RM OBSERVATION: CPT

## 2018-11-27 PROCEDURE — C9113 INJ PANTOPRAZOLE SODIUM, VIA: HCPCS | Performed by: SURGERY

## 2018-11-27 PROCEDURE — 74011000250 HC RX REV CODE- 250: Performed by: SURGERY

## 2018-11-27 PROCEDURE — 74011250636 HC RX REV CODE- 250/636: Performed by: SURGERY

## 2018-11-27 PROCEDURE — 74011250637 HC RX REV CODE- 250/637: Performed by: SURGERY

## 2018-11-27 PROCEDURE — 85027 COMPLETE CBC AUTOMATED: CPT

## 2018-11-27 PROCEDURE — 83735 ASSAY OF MAGNESIUM: CPT

## 2018-11-27 PROCEDURE — 74011250637 HC RX REV CODE- 250/637: Performed by: NURSE PRACTITIONER

## 2018-11-27 RX ORDER — IBUPROFEN 800 MG/1
800 TABLET ORAL
Qty: 40 TAB | Refills: 0 | Status: SHIPPED | OUTPATIENT
Start: 2018-11-27

## 2018-11-27 RX ORDER — DOCUSATE SODIUM 100 MG/1
100 CAPSULE, LIQUID FILLED ORAL DAILY
Status: DISCONTINUED | OUTPATIENT
Start: 2018-11-27 | End: 2018-11-27 | Stop reason: HOSPADM

## 2018-11-27 RX ORDER — OXYCODONE AND ACETAMINOPHEN 5; 325 MG/1; MG/1
TABLET ORAL
Qty: 40 TAB | Refills: 0 | Status: SHIPPED | OUTPATIENT
Start: 2018-11-27 | End: 2018-12-07

## 2018-11-27 RX ORDER — AMLODIPINE BESYLATE 10 MG/1
10 TABLET ORAL DAILY
Status: DISCONTINUED | OUTPATIENT
Start: 2018-11-27 | End: 2018-11-27 | Stop reason: HOSPADM

## 2018-11-27 RX ADMIN — DOCUSATE SODIUM 100 MG: 100 CAPSULE, LIQUID FILLED ORAL at 09:06

## 2018-11-27 RX ADMIN — SODIUM CHLORIDE 40 MG: 9 INJECTION, SOLUTION INTRAMUSCULAR; INTRAVENOUS; SUBCUTANEOUS at 09:06

## 2018-11-27 RX ADMIN — OXYCODONE HYDROCHLORIDE AND ACETAMINOPHEN 1 TABLET: 5; 325 TABLET ORAL at 12:07

## 2018-11-27 RX ADMIN — Medication 10 ML: at 05:43

## 2018-11-27 RX ADMIN — SODIUM CHLORIDE, SODIUM LACTATE, POTASSIUM CHLORIDE, AND CALCIUM CHLORIDE 100 ML/HR: 600; 310; 30; 20 INJECTION, SOLUTION INTRAVENOUS at 03:30

## 2018-11-27 RX ADMIN — ENOXAPARIN SODIUM 40 MG: 100 INJECTION SUBCUTANEOUS at 11:41

## 2018-11-27 RX ADMIN — AMLODIPINE BESYLATE 10 MG: 10 TABLET ORAL at 09:43

## 2018-11-27 RX ADMIN — KETOROLAC TROMETHAMINE 30 MG: 30 INJECTION, SOLUTION INTRAMUSCULAR at 05:43

## 2018-11-27 NOTE — DISCHARGE SUMMARY
4 Janelle Romero MD, [facility]  General Surgery  Discharge Summary     Patient ID:  Julia Nicholas  [id number]  85 y.o.  1955    Admit Date: 11/26/2018    Discharge Date: 11/27/2018    Admission Diagnoses: Recurrent left inguinal hernia [K40.91]  Right inguinal hernia [K40.90]    Discharge Diagnoses:    Problem List as of 11/27/2018 Date Reviewed: 10/17/2018          Codes Class Noted - Resolved    Bilateral inguinal hernia ICD-10-CM: K40.20  ICD-9-CM: 550.92  11/26/2018 - Present               Admission Condition: Good    Discharge Condition: Good    Last Procedure: Procedure(s):  BILATERAL OPEN INGUINAL HERNIA REPAIRS WITH [address] Course:   Normal hospital course for this procedure. Consults: None    Significant Diagnostic Studies: labs: CBC, CMP, Magnesium    Disposition: home    Patient Instructions:   Current Discharge Medication List      START taking these medications    Details   oxyCODONE-acetaminophen (PERCOCET) 5-325 mg per tablet 1 tabs every 4 hours as needed for pain  Qty: 40 Tab, Refills: 0    Associated Diagnoses: Incisional pain      docusate sodium (COLACE) 50 mg capsule Take 1 Cap by mouth two (2) times a day for 90 days. Qty: 60 Cap, Refills: 2      ibuprofen (MOTRIN) 800 mg tablet Take 1 Tab by mouth every eight (8) hours as needed for Pain. Qty: 40 Tab, Refills: 0         CONTINUE these medications which have NOT CHANGED    Details   hydroCHLOROthiazide (HYDRODIURIL) 25 mg tablet Take 25 mg by mouth daily. Associated Diagnoses: Recurrent ventral hernia; Recurrent left inguinal hernia; Right inguinal hernia      amLODIPine (NORVASC) 10 mg tablet Take  by mouth daily. Associated Diagnoses: Recurrent ventral hernia; Recurrent left inguinal hernia; Right inguinal hernia      aspirin delayed-release 325 mg tablet Take  by mouth every six (6) hours as needed for Pain. Associated Diagnoses: Recurrent ventral hernia;  Recurrent left inguinal hernia; Right inguinal hernia           Activity: No lifting, Driving, or Strenuous exercise for 6 weeks and See surgical instructions  Diet: Cardiac Diet  Wound Care: Keep wound clean and dry, ice 20 min 2-4 times daily as needed for discomfort and swelling    Follow-up with Dr. Yokasta Jones or Ni Tyler NP in 2 weeks.   Follow-up tests/labs N/A    Signed:  Honey Parks NP  11/27/2018  9:17 AM

## 2018-11-27 NOTE — DISCHARGE INSTRUCTIONS
Hernia Repair: What to Expect at [address] are likely to have pain for the next few days. You may also feel like you have the flu, and you may have a low fever and feel tired and nauseated. This is common. You should feel better after a few days and will probably feel much better in 7 days. For several weeks you may feel twinges or pulling in the hernia repair when you move. You may have some bruising on the scrotum and along the penis. This is normal. Men will need to wear a jockstrap or briefs, not boxers, for scrotal support for several days after a groin (inguinal) hernia repair. [employer] bicycle shorts may provide good support. This care sheet gives you a general idea about how long it will take for you to recover. But each person recovers at a different pace. Follow the steps below to get better as quickly as possible. How can you care for yourself at home? Activity    · Rest when you feel tired. Getting enough sleep will help you recover.     · Try to walk each day. Start by walking a little more than you did the day before. Bit by bit, increase the amount you walk. Walking boosts blood flow and helps prevent pneumonia and constipation.     · Avoid strenuous activities, such as biking, jogging, weight lifting, or aerobic exercise, until your doctor says it is okay.     · Avoid lifting anything that would make you strain. This may include heavy grocery bags and milk containers, a heavy briefcase or backpack, cat litter or dog food bags, a vacuum , or a child.     · You may drive when you are no longer taking pain medicine and can quickly move your foot from the gas pedal to the brake. You must also be able to sit comfortably for a long period of time, even if you do not plan to go far. You might get caught in traffic.     · Most people are able to return to work within 1 to 2 weeks after surgery.     · You may shower 24 to 48 hours after surgery, if your doctor okays it.  Pat the cut (incision) dry. Do not take a bath for the first 2 weeks, or until your doctor tells you it is okay.     · Your doctor will tell you when you can have sex again. Diet    · You can eat your normal diet. If your stomach is upset, try bland, low-fat foods like plain rice, broiled chicken, toast, and yogurt.     · Drink plenty of fluids (unless your doctor tells you not to).     · You may notice that your bowel movements are not regular right after your surgery. This is common. Avoid constipation and straining with bowel movements. You may want to take a fiber supplement every day. If you have not had a bowel movement after a couple of days, ask your doctor about taking a mild laxative. Medicines    · Your doctor will tell you if and when you can restart your medicines. He or she will also give you instructions about taking any new medicines.     · If you take blood thinners, such as warfarin (Coumadin), clopidogrel (Plavix), or aspirin, be sure to talk to your doctor. He or she will tell you if and when to start taking those medicines again. Make sure that you understand exactly what your doctor wants you to do.     · Be safe with medicines. Take pain medicines exactly as directed. ? If the doctor gave you a prescription medicine for pain, take it as prescribed. ? If you are not taking a prescription pain medicine, take an over-the-counter medicine such as acetaminophen (Tylenol), ibuprofen (Advil, Motrin), or naproxen (Aleve). Read and follow all instructions on the label. ? Do not take two or more pain medicines at the same time unless the doctor told you to. Many pain medicines have acetaminophen, which is Tylenol. Too much acetaminophen (Tylenol) can be harmful.     · If your doctor prescribed antibiotics, take them as directed. Do not stop taking them just because you feel better.  You need to take the full course of antibiotics.     · If you think your pain medicine is making you sick to your stomach:  ? Take your medicine after meals (unless your doctor has told you not to). ? Ask your doctor for a different pain medicine. Incision care    · If you have strips of tape on the cut (incision) the doctor made, leave the tape on for a week or until it falls off.     · If you have staples closing the cut, you will need to visit your doctor in 1 to 2 weeks to have them removed.     · Wash the area daily with warm, soapy water and pat it dry. Follow-up care is a key part of your treatment and safety. Be sure to make and go to all appointments, and call your doctor if you are having problems. It's also a good idea to know your test results and keep a list of the medicines you take. When should you call for help? Call 911 anytime you think you may need emergency care. For example, call if:    · You passed out (lost consciousness).     · You are short of breath.    Call your doctor now or seek immediate medical care if:    · You have pain that does not get better after you take pain medicine.     · You are sick to your stomach and cannot keep fluids down.     · You have signs of a blood clot in your leg (called a deep vein thrombosis), such as:  ? Pain in your calf, back of the knee, thigh, or groin. ? Redness and swelling in your leg or groin.     · You cannot pass stools or gas.     · Bright red blood has soaked through the bandage over your incision.     · You have loose stitches, or your incision comes open.     · You have signs of infection, such as:  ? Increased pain, swelling, warmth, or redness. ? Red streaks leading from the incision. ? Pus draining from the incision. ? A fever.    Watch closely for any changes in your health, and be sure to contact your doctor if you have any problems. Where can you learn more? Go to http://jeniffer-bere.info/. Enter A984 in the search box to learn more about \"Hernia Repair: What to Expect at Home. \"  Current as of: March 28, 2018  Content Version: 11.8  © 5886-6127 Healthwise, Incorporated. Care instructions adapted under license by [employer] (which disclaims liability or warranty for this information). If you have questions about a medical condition or this instruction, always ask your healthcare professional. [address] any warranty or liability for your use of this information.

## 2018-11-27 NOTE — ROUTINE PROCESS
Bedside and Verbal shift change report given to [address] [place] [address] (oncoming nurse) by Prudence Phalen RN (offgoing nurse). Report included the following information SBAR, Kardex and MAR.

## 2018-11-27 NOTE — PROGRESS NOTES
Received pt in stable condition, General: lying in bed in supine, not apparent distress, 0 pain, voiced no compliants at this time. Neuro: AOx4 Cardio:  denies chest pain Respiratory: denies shortness of breath Skin: Dressing to abdomen clean, dry and intact (4x4 and tagaderm); old scar to mid abdomen  from old surgery. GI: voiding : denies nausea and vomiting Mus: ambulates w/o assistance Bed in low position and call bell within reach.

## 2018-11-27 NOTE — PROGRESS NOTES
GENERAL SURGERY 
PROGRESS NOTE Name: Katarina Willson MRN: [id number] : 1955 Hospital: [facility] Date: 2018 Admission Date: 2018  5:36 AM  
 
Hospital Day: 2 
1 Day Post-Op Subjective: 
Patient visited lying in bed. Denies pain. Denies nausea after eating. Denies BM, but has passed flatus. She does not recall ever having percocet for pain, so she can call the office if she has any complications after discharge. Her only concern is related to her home meds for HTN, which I ordered for this AM. We reviewed post op activity restrictions, usage of ice, and follow up. I answered all questions to her satisfaction. Objective: 
Vitals:  
 18 1942 18 0530 18 0725 18 9390 BP: 126/77 128/81 143/83 Pulse: 100 84 84 Resp:  Temp: 99 °F (37.2 °C) 98.3 °F (36.8 °C) 98.5 °F (36.9 °C) SpO2: 100% 99% 100% Weight:    78.1 kg (172 lb 3.2 oz) Height:      
 
Date 18 0700 - 18 2219 18 07 - 18 5087 Shift 9304-2568 9692-3259 24 Hour Total 9996-8263 5507-5319 24 Hour Total  
INTAKE  
P.O.  440 440 240  240  
  P. O.  440 440 240  240  
I. V.(mL/kg/hr)  7181.2(2.6) 1666.7(0.9) Volume (lactated Ringers infusion)  1666.7 1666.7 Shift Total(mL/kg)  5474.9(60.2) 0176.1(34.5) 240(3.1)  240(3.1) OUTPUT Urine(mL/kg/hr) 1100(1.2) 1650(1.8) 2750(1.5) 500  500 Urine Voided 500 1650 2150 500  500 Urine Occurrence(s) 1 x  1 x Urine Output 600  600 Shift Total(mL/kg) 1100(14.4) 1650(21.6) 2750(36) 500(6.4)  500(6.4) NET -1100 456.7 -643.3 -260  -260 Weight (kg) 76.5 76.5 76.5 78.1 78.1 78.1 Physical Exam:  
 General: Alert and oriented x 3, cooperative, in no apparent distress Abdomen: abdomen is soft with no tenderness. Incision(s) are C/D/I. No   masses, organomegaly or guarding Labs: 
Recent Results (from the past 24 hour(s)) CBC W/O DIFF  
 Collection Time: 11/27/18  1:49 AM  
Result Value Ref Range WBC 14.5 (H) 4.6 - 13.2 K/uL  
 RBC 3.84 (L) 4.20 - 5.30 M/uL  
 HGB 11.3 (L) 12.0 - 16.0 g/dL HCT 34.3 (L) 35.0 - 45.0 % MCV 89.3 74.0 - 97.0 FL  
 MCH 29.4 24.0 - 34.0 PG  
 MCHC 32.9 31.0 - 37.0 g/dL  
 RDW 14.1 11.6 - 14.5 % PLATELET 851 081 - 306 K/uL MPV 9.6 9.2 - 60.8 FL  
METABOLIC PANEL, BASIC Collection Time: 11/27/18  1:49 AM  
Result Value Ref Range Sodium 139 136 - 145 mmol/L Potassium 3.6 3.5 - 5.5 mmol/L Chloride 101 100 - 108 mmol/L  
 CO2 29 21 - 32 mmol/L Anion gap 9 3.0 - 18 mmol/L Glucose 127 (H) 74 - 99 mg/dL BUN 16 7.0 - 18 MG/DL Creatinine 0.81 0.6 - 1.3 MG/DL  
 BUN/Creatinine ratio 20 12 - 20 GFR est AA >60 >60 ml/min/1.73m2 GFR est non-AA >60 >60 ml/min/1.73m2 Calcium 8.8 8.5 - 10.1 MG/DL  
PHOSPHORUS Collection Time: 11/27/18  1:49 AM  
Result Value Ref Range Phosphorus 4.2 2.5 - 4.9 MG/DL MAGNESIUM Collection Time: 11/27/18  1:49 AM  
Result Value Ref Range Magnesium 2.2 1.6 - 2.6 mg/dL All Micro Results None Current Medications: 
Current Facility-Administered Medications Medication Dose Route Frequency Provider Last Rate Last Dose  docusate sodium (COLACE) capsule 100 mg  100 mg Oral DAILY Yaneth Sahu MD   100 mg at 11/27/18 5116  
 HYDROmorphone (DILAUDID) injection 0.5 mg  0.5 mg IntraVENous Ramona Ramirez CRNA  sodium chloride (NS) flush 5-10 mL  5-10 mL IntraVENous Q8H Yaneth Sahu MD   10 mL at 11/27/18 0543  sodium chloride (NS) flush 5-10 mL  5-10 mL IntraVENous PRN Yaneth Sahu MD      
 lactated Ringers infusion  100 mL/hr IntraVENous CONTINUOUS Yaneth Sahu  mL/hr at 11/27/18 0330 100 mL/hr at 11/27/18 0330  
 oxyCODONE-acetaminophen (PERCOCET) 5-325 mg per tablet 2 Tab  2 Tab Oral Q4H PRN Yaneth Sahu MD      
  ondansetron (ZOFRAN) injection 4 mg  4 mg IntraVENous Q4H PRN Andrey Landau, MD      
 pantoprazole (PROTONIX) 40 mg in sodium chloride 0.9% 10 mL injection  40 mg IntraVENous DAILY Andrey Landau, MD   40 mg at 11/27/18 4216  enoxaparin (LOVENOX) injection 40 mg  40 mg SubCUTAneous Q24H Andrey Landau, MD   40 mg at 11/26/18 1312  ketorolac (TORADOL) injection 30 mg  30 mg IntraVENous Q8H Andrey Landau, MD   30 mg at 11/27/18 0543 Chart and notes reviewed. Data reviewed. I have evaluated and examined the patient. IMPRESSION:  
· Patient POD 1 from open, bilateral, inguinal hernias with plug and mesh doing well. PLAN:/DISCUSION:  
· Discharge home today on home meds · Rest, ice, follow up in 2 weeks as outpatient YENNY Phipps

## 2018-11-27 NOTE — PROGRESS NOTES
Discharge order noted for today. Met with pt and agreeable to the transition plan today. Transport has been arranged with family per pt. 
 
 IOANA Mcconnell, RN Pager # [phone] Care Manager

## 2018-11-27 NOTE — PROGRESS NOTES
Bedside and Verbal shift change report given to Minor Segundo RN (oncoming nurse) by Kimberley Paez RN (offgoing nurse). Report included the following information SBAR, Kardex, OR Summary, Procedure Summary, Intake/Output and MAR.

## 2018-11-27 NOTE — MED STUDENT NOTES
*ATTENTION:  This note has been created by a medical student for educational purposes only. Please do not refer to the content of this note for clinical decision-making, billing, or other purposes. Please see attending physicians note to obtain clinical information on this patient. * Dharmesh Barbosa M.D. FACS PROGRESS NOTE Name: Dakota Nolasco MRN: [id number] : 1955 Hospital: [facility] Date: 2018 Admission Date: 2018  5:36 AM  
 
Hospital Day: 2 
1 Day Post-Op Subjective: 
Ms. Lore Ramirez presented yesterday for a bilateral direct inguinal hernia repair that was fixed via an open repair. She was admitted after the procedure to reduce risks and complications due to previous peritoneal surgeries. Today she is feeling well and states that her pain is a 1-2 out of 10. She denies fever, nausea, vomiting, diarrhea, frequency, urgency, chest pain, shortness of breath and pain at incision site. She stated that she has had no issues with urination and that she has not had a bowel movement since surgery. She is not experiencing any irritation at the incision site. She has been keeping an ice pack over the dressing of the incision site. Objective: 
Vitals:  
 18 1228 18 1600 18 1942 18 0530 BP: 136/77 132/80 126/77 128/81 Pulse: 100 (!) 107 100 84 Resp: 17  20 18 Temp: 98.1 °F (36.7 °C) 97.8 °F (36.6 °C) 99 °F (37.2 °C) 98.3 °F (36.8 °C) SpO2: 99% 100% 100% 99% Weight:      
Height:      
 
Date 18 0700 - 18 0659 18 0700 - 18 2874 Shift 2804-9887 4886-8671 24 Hour Total 3293-1537 0110-6098 24 Hour Total  
INTAKE  
P.O.  440 440     
  P. O.  440 440     
I. V.(mL/kg/hr)  1666.7 1666.7 Volume (lactated Ringers infusion)  1666.7 1666.7 Shift Total(mL/kg)  1510.9(00.6) 3369.0(32.8) OUTPUT Urine(mL/kg/hr) 1100(1.2) 1650 2750 Urine Voided 500 1650 2150 Urine Occurrence(s) 1 x  1 x Urine Output 600  600 Shift Total(mL/kg) 1100(14.4) [id number]) [id number]) NET -1100 456.7 -643. 3 Weight (kg) 76.5 76.5 76.5 76.5 76.5 76.5 Physical Exam:  
 General: Patient is awake, alert and oriented x3. No acute distress. Abdomen: abdomen is soft with no tenderness. Incisions are covered and not showing signs of infection or irritation. No masses, organomegaly or guarding. Cardiovascular: Heart RRR with normal S1 and S2. No rubs, gallops, clicks or murmurs. Pulses 2+ t/o b/l UE and LE Respiratory: Lungs CTA in all lung fields without wheezes, rhonchi, rales or crackles. Labs: 
Recent Results (from the past 24 hour(s)) CBC W/O DIFF Collection Time: 11/27/18  1:49 AM  
Result Value Ref Range WBC 14.5 (H) 4.6 - 13.2 K/uL  
 RBC 3.84 (L) 4.20 - 5.30 M/uL  
 HGB 11.3 (L) 12.0 - 16.0 g/dL HCT 34.3 (L) 35.0 - 45.0 % MCV 89.3 74.0 - 97.0 FL  
 MCH 29.4 24.0 - 34.0 PG  
 MCHC 32.9 31.0 - 37.0 g/dL  
 RDW 14.1 11.6 - 14.5 % PLATELET 377 784 - 412 K/uL MPV 9.6 9.2 - 50.3 FL  
METABOLIC PANEL, BASIC Collection Time: 11/27/18  1:49 AM  
Result Value Ref Range Sodium 139 136 - 145 mmol/L Potassium 3.6 3.5 - 5.5 mmol/L Chloride 101 100 - 108 mmol/L  
 CO2 29 21 - 32 mmol/L Anion gap 9 3.0 - 18 mmol/L Glucose 127 (H) 74 - 99 mg/dL BUN 16 7.0 - 18 MG/DL Creatinine 0.81 0.6 - 1.3 MG/DL  
 BUN/Creatinine ratio 20 12 - 20 GFR est AA >60 >60 ml/min/1.73m2 GFR est non-AA >60 >60 ml/min/1.73m2 Calcium 8.8 8.5 - 10.1 MG/DL  
PHOSPHORUS Collection Time: 11/27/18  1:49 AM  
Result Value Ref Range Phosphorus 4.2 2.5 - 4.9 MG/DL MAGNESIUM Collection Time: 11/27/18  1:49 AM  
Result Value Ref Range Magnesium 2.2 1.6 - 2.6 mg/dL All Micro Results None Current Medications: 
Current Facility-Administered Medications Medication Dose Route Frequency Provider Last Rate Last Dose  HYDROmorphone (DILAUDID) injection 0.5 mg  0.5 mg IntraVENous Multiple Maria De Jesus Reynoso CRNA  sodium chloride (NS) flush 5-10 mL  5-10 mL IntraVENous Q8H Tess Martino MD   10 mL at 11/27/18 0543  sodium chloride (NS) flush 5-10 mL  5-10 mL IntraVENous PRN Tess Martino MD      
 lactated Ringers infusion  100 mL/hr IntraVENous CONTINUOUS Tess Martino  mL/hr at 11/27/18 0330 100 mL/hr at 11/27/18 0330  
 oxyCODONE-acetaminophen (PERCOCET) 5-325 mg per tablet 2 Tab  2 Tab Oral Q4H PRN Tess Martino MD      
 ondansetron [facility]) injection 4 mg  4 mg IntraVENous Q4H PRN Tess Martino MD      
 pantoprazole (PROTONIX) 40 mg in sodium chloride 0.9% 10 mL injection  40 mg IntraVENous DAILY Tess Martino MD   40 mg at 11/26/18 1312  enoxaparin (LOVENOX) injection 40 mg  40 mg SubCUTAneous Q24H Tess Martino MD   40 mg at 11/26/18 1312  ketorolac (TORADOL) injection 30 mg  30 mg IntraVENous Q8H Tess Martino MD   30 mg at 11/27/18 0543 Chart and notes reviewed. Data reviewed. I have evaluated and examined the patient. IMPRESSION:  
· Patient 1 day post op for open repair of bilateral direct inguinal hernias with plug and mesh · Patient is recovering well from surgery PLAN:/DISCUSION:  
· Introduce soft foods into diet today and continue at home medications. · Discharge patient home as labs and physical exam are unconcerning. · Discuss with patient to contact physician if she does not have a bowel movement in the next few days due to risk of ileus or obstruction from surgery. Discuss use of laxatives if patient does not have a bowel movement.    
 
  
Matt Acuña MD

## 2018-11-27 NOTE — PROGRESS NOTES
Reason for Admission:  Recurrent left inguinal hernia [K40.91] Right inguinal hernia [K40.90] RRAT Score:    7 Plan for utilizing home health:    No home health orders. Likelihood of Readmission:   LOW Transition of Care Plan:         
Initial assessment completed with patient. Face sheet information confirmed:  yes. The patient requests we communicate with son: Kindra Muhammad in reference to medical care. This patient is staying (temporarily) in a single family home with her son. She will return to [facility] on 1/7/19. Patient is able to navigate steps as needed. Prior to hospitalization, patient was considered to be independent : yes . Cognitive status of patient: oriented to time, place, person and situation. Patient has a current ACP document on file: no The patient's son will be available to transport patient home upon discharge. The patient has no DME at home. Patient is not currently active with home health. Patient has not ever stayed in a skilled nursing facility or rehab. Currently, the discharge plan is Home. The patient states that she may run out of her blood pressure medication, which she brought from [facility]. She does not have insurance here. The patient was provided with the [facility] schedule for her reference. Care Management Interventions PCP Verified by CM: No(No PCP here in [facility]; she's here temporarily from [facility]) Palliative Care Criteria Met (RRAT>21 & CHF Dx)?: No 
Mode of Transport at Discharge: Self Transition of Care Consult (CM Consult): Discharge Planning Discharge Durable Medical Equipment: No 
Physical Therapy Consult: No 
Occupational Therapy Consult: No 
Current Support Network: Other(She's staying with her son: Kindra Muhammad) Confirm Follow Up Transport: Family(Esha Hdz, son) Plan discussed with Pt/Family/Caregiver: Yes Discharge Location Discharge Placement: Home Melvin Huggins RN [facility] Care Management [phone]

## 2018-12-10 ENCOUNTER — OFFICE VISIT (OUTPATIENT)
Dept: SURGERY | Age: 63
End: 2018-12-10

## 2018-12-10 VITALS
OXYGEN SATURATION: 98 % | TEMPERATURE: 98.8 F | DIASTOLIC BLOOD PRESSURE: 86 MMHG | HEART RATE: 96 BPM | SYSTOLIC BLOOD PRESSURE: 132 MMHG | RESPIRATION RATE: 18 BRPM

## 2018-12-10 DIAGNOSIS — Z09 POSTOPERATIVE EXAMINATION: Primary | ICD-10-CM

## 2018-12-10 NOTE — PATIENT INSTRUCTIONS
Inguinal Hernia: Care Instructions  Your Care Instructions    An inguinal hernia occurs when tissue bulges through a weak spot in your groin area. You may see or feel a tender bulge in the groin or scrotum. You may also have pain, pressure or burning, or a feeling that something has \"given way. \"  Hernias are caused by a weakness in the belly wall. The bulge or discomfort may occur after heavy lifting, straining, or coughing. Hernias do not heal on their own, and they tend to get worse over time. If your hernia does not bother you, you most likely can wait to have surgery. Your hernia may get worse, but it may not. In some cases, hernias that are small and painless may never need to be repaired. Follow-up care is a key part of your treatment and safety. Be sure to make and go to all appointments, and call your doctor if you are having problems. It's also a good idea to know your test results and keep a list of the medicines you take. How can you care for yourself at home? · Take pain medicines exactly as directed. ? If the doctor gave you a prescription medicine for pain, take it as prescribed. ? If you are not taking a prescription pain medicine, ask your doctor if you can take an over-the-counter medicine. · Use proper lifting techniques, and avoid heavy lifting if you can. To lift things more safely, bend your knees and let your arms and legs do the work. Keep your back straight, and do not bend over at the waist. Keep the load as close to your body as you can. Move your feet instead of turning or twisting your body. · Lose weight if you are overweight. · Include fruits, vegetables, legumes, and whole grains in your diet each day. These foods are high in fiber and will make it easier to avoid straining during bowel movements. · Do not smoke. Smoking can cause coughing, which can cause your hernia to bulge. If you need help quitting, talk to your doctor about stop-smoking programs and medicines. These can increase your chances of quitting for good. When should you call for help? Call your doctor now or seek immediate medical care if:    · You have new or worse belly pain.     · You are vomiting.     · You cannot pass stools or gas.     · You cannot push the hernia back into place with gentle pressure when you are lying down.     · The area over the hernia turns red or becomes tender.    Watch closely for changes in your health, and be sure to contact your doctor if you have any problems. Where can you learn more? Go to http://jeniffer-bere.info/. Enter X425 in the search box to learn more about \"Inguinal Hernia: Care Instructions. \"  Current as of: March 28, 2018  Content Version: 11.8  © 9413-6005 Healthwise, Incorporated. Care instructions adapted under license by [employer] (which disclaims liability or warranty for this information). If you have questions about a medical condition or this instruction, always ask your healthcare professional. [place] [address] any warranty or liability for your use of this information.

## 2018-12-10 NOTE — PROGRESS NOTES
IVELISSE Guevara  PROGRESS NOTE    Name: Marty Garvey MRN: [id number]   : 1955 Hospital: [facility]   Date: 12/10/2018 Admission Date: No admission date for patient encounter. Subjective:  Ms. Relda Canavan is a very pleasant 60 yo AA female who presents today with her son for a 2 week post op exam following open, bilateral, inguinal hernia repairs with mesh. She is doing well and only reports some soreness. She is not taking anything for pain. She denies nausea or constipation. She reports regular bowel movements. She stopped colace. We discussed the importance of activity restrictions to prevent recurrence. This includes straining to have a BM. She can restart the colace if straining becomes a problem. Objective:  Vitals:    12/10/18 1020   BP: 132/86   Pulse: 96   Resp: 18   Temp: 98.8 °F (37.1 °C)   SpO2: 98%        Physical Exam:   General:  Well developed well nourished female in no apparent distress   Abdomen: abdomen is soft with no tenderness. No masses, organomegaly or guarding. Incision is almost healed completely. There is some suprapubic, purple bruising that she states has been there since surgery. Labs:  No results found for this or any previous visit (from the past 24 hour(s)). Current Medications:  Current Outpatient Medications   Medication Sig Dispense Refill    docusate sodium (COLACE) 50 mg capsule Take 1 Cap by mouth two (2) times a day for 90 days. 60 Cap 2    ibuprofen (MOTRIN) 800 mg tablet Take 1 Tab by mouth every eight (8) hours as needed for Pain. 40 Tab 0    aspirin delayed-release 325 mg tablet Take  by mouth every six (6) hours as needed for Pain.  hydroCHLOROthiazide (HYDRODIURIL) 25 mg tablet Take 25 mg by mouth daily.  amLODIPine (NORVASC) 10 mg tablet Take  by mouth daily. Chart and notes reviewed. Data reviewed. I have evaluated and examined the patient.         IMPRESSION:   · Patient doing well 2 weeks out from bilateral, open hernia repairs with mesh. PLAN:/DISCUSION:   · Use ice to area of bruising until it resolves  · May use tylenol or ibuprofen for discomfort as needed  · Stool softener as needed to prevent constipation  · Follow up as needed      Ms. Beryl Corbin has a reminder for a \"due or due soon\" health maintenance. I have asked that she contact her primary care provider for follow-up on this health maintenance. Dia Gloria.  Barbara Degroot, DONP-C

## 2018-12-27 ENCOUNTER — OFFICE VISIT (OUTPATIENT)
Dept: SURGERY | Age: 63
End: 2018-12-27

## 2018-12-27 VITALS
SYSTOLIC BLOOD PRESSURE: 140 MMHG | DIASTOLIC BLOOD PRESSURE: 98 MMHG | HEART RATE: 86 BPM | OXYGEN SATURATION: 99 % | RESPIRATION RATE: 16 BRPM | TEMPERATURE: 98.1 F

## 2018-12-27 DIAGNOSIS — G89.18 POST-OP PAIN: ICD-10-CM

## 2018-12-27 DIAGNOSIS — Z09 POSTOPERATIVE EXAMINATION: Primary | ICD-10-CM

## 2018-12-27 NOTE — PROGRESS NOTES
Nohemy Ervin. JOHN Tomlinson-REID  PROGRESS NOTE    Name: Art Angel MRN: [id number]   : 1955 Hospital: [facility]   Date: 2018 Admission Date: No admission date for patient encounter. Subjective:  Ms. Sylvie Cadet presents today with her son for a problem visit. She has just noticed a \"knot\" in the area just deep to her incision. She denies injury or feeling a pop. She has been honoring activity restrictions. She has continued to use ice for swelling and states that is better. Once the swelling resolved, she noticed a lump. We discussed post operative healing following an open surgical repair. She is going back to [facility] in one month and wanted to make sure she was healing normally. She does have a history of multiple, abdominal surgeries, so we had to manage her post op healing expectations. Objective:  Vitals:    18 1539   BP: (!) 140/98   Pulse: 86   Resp: 16   Temp: 98.1 °F (36.7 °C)   SpO2: 99%        Physical Exam:   General:  Well developed well nourished female in no apparent distress   Abdomen: abdomen is soft with no tenderness. No masses, organomegaly or   Guarding. Mass consistent with post operative, internal closure deep to lateral half of incision. Labs:  No results found for this or any previous visit (from the past 24 hour(s)). Current Medications:  Current Outpatient Medications   Medication Sig Dispense Refill    aspirin delayed-release 325 mg tablet Take  by mouth every six (6) hours as needed for Pain.  hydroCHLOROthiazide (HYDRODIURIL) 25 mg tablet Take 25 mg by mouth daily.  amLODIPine (NORVASC) 10 mg tablet Take  by mouth daily.  docusate sodium (COLACE) 50 mg capsule Take 1 Cap by mouth two (2) times a day for 90 days. 60 Cap 2    ibuprofen (MOTRIN) 800 mg tablet Take 1 Tab by mouth every eight (8) hours as needed for Pain. 40 Tab 0       Chart and notes reviewed. Data reviewed. I have evaluated and examined the patient. IMPRESSION:   · Patient healing well, but slowly now about a month out from open, bilateral, inguinal hernia repairs with mesh. PLAN:/DISCUSION:   · Continue activity restrictions for at least another month  · Follow up as needed or call the office with questions or concerns     Ms. Brandt January has a reminder for a \"due or due soon\" health maintenance. I have asked that she contact her primary care provider for follow-up on this health maintenance. José Miguel Riley.  [place], FNP-C

## 2018-12-27 NOTE — PATIENT INSTRUCTIONS
Hernia Repair: What to Expect at [address] are likely to have pain for the next few days. You may also feel like you have the flu, and you may have a low fever and feel tired and nauseated. This is common. You should feel better after a few days and will probably feel much better in 7 days. For several weeks you may feel twinges or pulling in the hernia repair when you move. You may have some bruising on the scrotum and along the penis. This is normal. Men will need to wear a jockstrap or briefs, not boxers, for scrotal support for several days after a groin (inguinal) hernia repair. Home[employer]adade[employer] bicycle shorts may provide good support. This care sheet gives you a general idea about how long it will take for you to recover. But each person recovers at a different pace. Follow the steps below to get better as quickly as possible. How can you care for yourself at home? Activity    · Rest when you feel tired. Getting enough sleep will help you recover.     · Try to walk each day. Start by walking a little more than you did the day before. Bit by bit, increase the amount you walk. Walking boosts blood flow and helps prevent pneumonia and constipation.     · Avoid strenuous activities, such as biking, jogging, weight lifting, or aerobic exercise, until your doctor says it is okay.     · Avoid lifting anything that would make you strain. This may include heavy grocery bags and milk containers, a heavy briefcase or backpack, cat litter or dog food bags, a vacuum , or a child.     · You may drive when you are no longer taking pain medicine and can quickly move your foot from the gas pedal to the brake. You must also be able to sit comfortably for a long period of time, even if you do not plan to go far. You might get caught in traffic.     · Most people are able to return to work within 1 to 2 weeks after surgery.     · You may shower 24 to 48 hours after surgery, if your doctor okays it.  Pat the cut (incision) dry. Do not take a bath for the first 2 weeks, or until your doctor tells you it is okay.     · Your doctor will tell you when you can have sex again. Diet    · You can eat your normal diet. If your stomach is upset, try bland, low-fat foods like plain rice, broiled chicken, toast, and yogurt.     · Drink plenty of fluids (unless your doctor tells you not to).     · You may notice that your bowel movements are not regular right after your surgery. This is common. Avoid constipation and straining with bowel movements. You may want to take a fiber supplement every day. If you have not had a bowel movement after a couple of days, ask your doctor about taking a mild laxative. Medicines    · Your doctor will tell you if and when you can restart your medicines. He or she will also give you instructions about taking any new medicines.     · If you take blood thinners, such as warfarin (Coumadin), clopidogrel (Plavix), or aspirin, be sure to talk to your doctor. He or she will tell you if and when to start taking those medicines again. Make sure that you understand exactly what your doctor wants you to do.     · Be safe with medicines. Take pain medicines exactly as directed. ? If the doctor gave you a prescription medicine for pain, take it as prescribed. ? If you are not taking a prescription pain medicine, take an over-the-counter medicine such as acetaminophen (Tylenol), ibuprofen (Advil, Motrin), or naproxen (Aleve). Read and follow all instructions on the label. ? Do not take two or more pain medicines at the same time unless the doctor told you to. Many pain medicines have acetaminophen, which is Tylenol. Too much acetaminophen (Tylenol) can be harmful.     · If your doctor prescribed antibiotics, take them as directed. Do not stop taking them just because you feel better.  You need to take the full course of antibiotics.     · If you think your pain medicine is making you sick to your stomach:  ? Take your medicine after meals (unless your doctor has told you not to). ? Ask your doctor for a different pain medicine. Incision care    · If you have strips of tape on the cut (incision) the doctor made, leave the tape on for a week or until it falls off.     · If you have staples closing the cut, you will need to visit your doctor in 1 to 2 weeks to have them removed.     · Wash the area daily with warm, soapy water and pat it dry. Follow-up care is a key part of your treatment and safety. Be sure to make and go to all appointments, and call your doctor if you are having problems. It's also a good idea to know your test results and keep a list of the medicines you take. When should you call for help? Call 911 anytime you think you may need emergency care. For example, call if:    · You passed out (lost consciousness).     · You are short of breath.    Call your doctor now or seek immediate medical care if:    · You have pain that does not get better after you take pain medicine.     · You are sick to your stomach and cannot keep fluids down.     · You have signs of a blood clot in your leg (called a deep vein thrombosis), such as:  ? Pain in your calf, back of the knee, thigh, or groin. ? Redness and swelling in your leg or groin.     · You cannot pass stools or gas.     · Bright red blood has soaked through the bandage over your incision.     · You have loose stitches, or your incision comes open.     · You have signs of infection, such as:  ? Increased pain, swelling, warmth, or redness. ? Red streaks leading from the incision. ? Pus draining from the incision. ? A fever.    Watch closely for any changes in your health, and be sure to contact your doctor if you have any problems. Where can you learn more? Go to http://jeniffer-bere.info/. Enter C463 in the search box to learn more about \"Hernia Repair: What to Expect at Home. \"  Current as of: March 28, 2018  Content Version: 11.8  © 3499-6676 [employer]. Care instructions adapted under license by [employer] (which disclaims liability or warranty for this information). If you have questions about a medical condition or this instruction, always ask your healthcare professional. [address] any warranty or liability for your use of this information.

## 2018-12-27 NOTE — PROGRESS NOTES
Chief Complaint   Patient presents with    Post OP Follow Up     c/o a lump underneath incision site on right groin area

## (undated) DEVICE — TRAY CATH OD16FR SIL URIN M STATLOK STBL DEV SURSTP

## (undated) DEVICE — KIT CLN UP BON SECOURS MARYV

## (undated) DEVICE — REM POLYHESIVE ADULT PATIENT RETURN ELECTRODE: Brand: VALLEYLAB

## (undated) DEVICE — SUTURE VCRL SZ 3-0 L27IN ABSRB UD L36MM CT-1 1/2 CIR J258H

## (undated) DEVICE — SPONGE DISSECT PNUT SM 3/8IN -- 5/PK

## (undated) DEVICE — FLEX ADVANTAGE 3000CC: Brand: FLEX ADVANTAGE

## (undated) DEVICE — SUTURE VCRL SZ 3-0 L27IN ABSRB UD L26MM SH 1/2 CIR J416H

## (undated) DEVICE — PENROSE TUBING RADIOPAQUE: Brand: ARGYLE

## (undated) DEVICE — (D)PACK ICE DISP -- DISC BY MFR

## (undated) DEVICE — SUTURE PDS II SZ 2-0 L27IN ABSRB VLT L36MM CT-1 1/2 CIR Z339H

## (undated) DEVICE — AIRLIFE™ ADULT CUSHION NASAL CANNULA 14 FOOT (4.3) CRUSH-RESISTANT OXYGEN TUBING, AND U/CONNECT-IT ADAPTER: Brand: AIRLIFE™

## (undated) DEVICE — PACK PROCEDURE SURG MAJ W/ BASIN LF

## (undated) DEVICE — SUT PROL 2-0 30IN CT1 BLU --

## (undated) DEVICE — SUTURE VCRL SZ 2-0 L12X18IN ABSRB UD POLYGLACTIN 910 BRAID J911T

## (undated) DEVICE — TRAY PREP DRY W/ PREM GLV 2 APPL 6 SPNG 2 UNDPD 1 OVERWRAP

## (undated) DEVICE — INTENDED FOR TISSUE SEPARATION, AND OTHER PROCEDURES THAT REQUIRE A SHARP SURGICAL BLADE TO PUNCTURE OR CUT.: Brand: BARD-PARKER SAFETY BLADES SIZE 15, STERILE

## (undated) DEVICE — STERILE POLYISOPRENE POWDER-FREE SURGICAL GLOVES: Brand: PROTEXIS

## (undated) DEVICE — SOLUTION IV 1000ML 0.9% SOD CHL

## (undated) DEVICE — SUTURE MCRYL SZ 4-0 L27IN ABSRB UD L24MM PS-1 3/8 CIR PRIM Y935H

## (undated) DEVICE — 3M™ BAIR PAWS FLEX™ WARMING GOWN, STANDARD, 20 PER CASE 81003: Brand: BAIR PAWS™

## (undated) DEVICE — SMOKE EVACUATION PENCIL: Brand: VALLEYLAB